# Patient Record
Sex: MALE | Race: WHITE | NOT HISPANIC OR LATINO | Employment: FULL TIME | URBAN - METROPOLITAN AREA
[De-identification: names, ages, dates, MRNs, and addresses within clinical notes are randomized per-mention and may not be internally consistent; named-entity substitution may affect disease eponyms.]

---

## 2021-03-31 ENCOUNTER — OFFICE VISIT (OUTPATIENT)
Dept: RADIATION ONCOLOGY | Facility: HOSPITAL | Age: 56
End: 2021-03-31

## 2021-03-31 ENCOUNTER — HOSPITAL ENCOUNTER (OUTPATIENT)
Dept: RADIATION ONCOLOGY | Facility: HOSPITAL | Age: 56
Setting detail: RADIATION/ONCOLOGY SERIES
Discharge: HOME | End: 2021-03-31

## 2021-03-31 VITALS
BODY MASS INDEX: 26.81 KG/M2 | WEIGHT: 176.9 LBS | RESPIRATION RATE: 16 BRPM | SYSTOLIC BLOOD PRESSURE: 154 MMHG | TEMPERATURE: 97.6 F | OXYGEN SATURATION: 98 % | HEART RATE: 75 BPM | HEIGHT: 68 IN | DIASTOLIC BLOOD PRESSURE: 74 MMHG

## 2021-03-31 DIAGNOSIS — C61 PROSTATE CANCER (HCC): Primary | ICD-10-CM

## 2021-03-31 PROCEDURE — G0463 HOSPITAL OUTPT CLINIC VISIT: HCPCS

## 2021-03-31 RX ORDER — LISINOPRIL 2.5 MG/1
TABLET ORAL
COMMUNITY

## 2021-03-31 RX ORDER — METOPROLOL SUCCINATE 50 MG/1
TABLET, EXTENDED RELEASE ORAL
COMMUNITY

## 2021-03-31 RX ORDER — ATORVASTATIN CALCIUM 10 MG/1
TABLET, FILM COATED ORAL
COMMUNITY

## 2021-03-31 RX ORDER — SILDENAFIL CITRATE 20 MG/1
TABLET ORAL
COMMUNITY

## 2021-03-31 RX ORDER — GLIPIZIDE 10 MG/1
TABLET ORAL
COMMUNITY

## 2021-03-31 RX ORDER — MULTIPLE VITAMINS W/ MINERALS TAB 9MG-400MCG
1 TAB ORAL DAILY
COMMUNITY

## 2021-03-31 RX ORDER — CHLORAL HYDRATE 500 MG
CAPSULE ORAL
COMMUNITY

## 2021-03-31 NOTE — PROGRESS NOTES
CONSULTATION NOTE      :                                                          1965  DATE OF CONSULTATION:                       3/31/2021   REQUESTING PHYSICIAN:                   Brent Vicente, *  REASON FOR CONSULTATION:           Prostate cancer (CMS/[facility])  Initial diagnosis- Stage I (cT1c, cN0, cM0, PSA: 7.8, Grade Group: 1)  Current- Stage IIA (cT1c, cN0, cM0, PSA: 10.2, Grade Group: 1)         BRIEF HISTORY:  The patient is a very pleasant 55 y.o. male  with prostate cancer initially diagnosed with low risk disease in  when PSA had increased to a value 7.8 ng/ml.  He has been on active surveillance with fairly stable PSA values of 7.62 ng/ml in 2020 and 7.88 ng/ml in 2020.  Repeat prostate biopsy 2020 revealed prostate cancer, [place]'s score 3+3 = 6 involving 2 out of 6 cores from the left lobe.  Tumor was present at the apex involving 5 and 40% of submitted tissue from those samples.  There was no mention of perineural invasion or extraprostatic extension.  Right lobe was benign.  PSA recently increased further to a value of 10.19 ng/ml on 3/4/2021.  Patient is now interested in pursuing definitive treatment.  He is interested in nonsurgical treatment options.  He is otherwise very healthy with controlled diabetes.  He is active, working.  [facility] healthy life expectancy calculator predicts an additional 32.2-year longevity is possible.    Allergy: Not on File    Social History:   Social History     Socioeconomic History   • Marital status:      Spouse name: Not on file   • Number of children: Not on file   • Years of education: Not on file   • Highest education level: Not on file   Tobacco Use   • Smoking status: Never Smoker   • Smokeless tobacco: Never Used   Substance and Sexual Activity   • Alcohol use: Yes     Comment: 1-2 beers per week   • Drug use: Never   • Sexual activity: Defer       Past Medical History:   Past Medical History:   Diagnosis Date   •  "Diabetes mellitus (CMS/HCC)    • Hypertension    • Pancreatitis    • Prostate cancer (CMS/HCC)        Family History: family history includes Breast cancer in his sister; Cervical cancer in his mother; Cirrhosis in his father; Diabetes in his mother; Heart disease in his father.     Surgical History:   Past Surgical History:   Procedure Laterality Date   • CHOLECYSTECTOMY     • COLONOSCOPY  08/2019   • PROSTATE BIOPSY          Review of Systems:   Review of Systems   All other systems reviewed and are negative.          Objective   VITAL SIGNS:   Vitals:    03/31/21 0937   BP: 154/74   Pulse: 75   Resp: 16   Temp: 97.6 °F (36.4 °C)   TempSrc: Temporal   SpO2: 98%  Comment: RA   Weight: 80.2 kg (176 lb 14.4 oz)   Height: 172.7 cm (68\")   PainSc: 0-No pain        Karnofsky score: 90      Physical Exam:   Physical Exam  Vitals and nursing note reviewed.   Constitutional:       Appearance: He is well-developed.   HENT:      Head: Normocephalic and atraumatic.   Cardiovascular:      Rate and Rhythm: Normal rate and regular rhythm.      Heart sounds: Normal heart sounds. No murmur heard.     Pulmonary:      Effort: Pulmonary effort is normal.      Breath sounds: Normal breath sounds. No wheezing or rales.   Abdominal:      General: Bowel sounds are normal. There is no distension.      Palpations: Abdomen is soft.      Tenderness: There is no abdominal tenderness.   Genitourinary:     Prostate: Enlarged ( 50 to 60 cc, symmetrically enlarged, smooth surface, soft, no nodules or induration.  Seminal vesicles are nonpalpable.). Not tender and no nodules present.      Rectum: No mass, tenderness, external hemorrhoid or internal hemorrhoid. Normal anal tone.   Musculoskeletal:         General: No tenderness. Normal range of motion.      Cervical back: Normal range of motion and neck supple.   Lymphadenopathy:      Cervical: No cervical adenopathy.      Upper Body:      Right upper body: No supraclavicular adenopathy.      Left " upper body: No supraclavicular adenopathy.   Skin:     General: Skin is warm and dry.   Neurological:      Mental Status: He is alert and oriented to person, place, and time.      Sensory: No sensory deficit.   Psychiatric:         Behavior: Behavior normal.         Thought Content: Thought content normal.         Judgment: Judgment normal.               IPSS Questionnaire (AUA-7):  Over the past month…    1)  Incomplete Emptying  How often have you had a sensation of not emptying your bladder?  1 - Less than 1 time in 5   2)  Frequency  How often have you had to urinate less than every two hours? 3 - About half the time   3)  Intermittency  How often have you found you stopped and started again several times when you urinated?  2 - Less than half the time   4) Urgency  How often have you found it difficult to postpone urination?  0 - Not at all   5) Weak Stream  How often have you had a weak urinary stream?  2 - Less than half the time   6) Straining  How often have you had to push or strain to begin urination?  0 - Not at all   7) Nocturia  How many times did you typically get up at night to urinate?  1 - 1 time   Total Score:  9       Quality of life due to urinary symptoms:  If you were to spend the rest of your life with your urinary condition the way it is now, how would you feel about that? 1-Pleased   Urine Leakage (Incontinence) 0-No Leakage     Sexual Health Inventory  Current Status    1)  How do you rate your confidence that you could achieve and keep an erection? 3-Moderate   2) When you had erections with sexual stimulation, how often were your erections hard enough for penetration (entering your partner)? 3-Sometime (about half the time)   3)  During sexual intercourse, how often were you able to maintain your erection after you had penetrated (entered) into your partner? 4-Most times (much more than half the time)   4) During sexual intercourse, how difficult was it to maintain your erection to  completion of intercourse? 5-Not difficult   5) When you attempted sexual intercourse, how often was it satisfactory to you? 5-Almost always or always   Total Score: 20       Bowel Health Inventory  Current Status: 0-No problems, no rectal bleeding, no discharge, less then 5 bowel movements a day             The following portions of the patient's history were reviewed and updated as appropriate: allergies, current medications, past family history, past medical history, past social history, past surgical history and problem list.    Assessment:   Assessment      Prostate cancer, [place] score 3+3 = 6, clinical stage IIA (T1c, N0, M0).  He has been on active surveillance since initial diagnosis in 2019.  PSA has increased from 7.8  ng/ml to most recent value 10.19 ng/ml.  He does have a repeat PSA scheduled for next month.  If this confirms higher value, he would like to pursue definitive treatment, which would be very reasonable, and recommended, at his age.  We reviewed the radiation treatment options of SBRT, IMRT and brachytherapy.  He is most interested in stereotactic body radiotherapy.  The CyberKnife treatment procedure has been reviewed in detail.  All questions answered.  Informed consent obtained.    RECOMMENDATIONS: Repeat PSA next month.  (Patient will hold all vitamin supplements prior to lab draw.)  Gold seed fiducial placement next month.  He will subsequently return here for treatment planning CT and MRI pelvis.  Prostate gland will receive 5 fractions of 7 Gray each, delivered on the CyberKnife.    Follow Up:   Return in about 5 weeks (around 5/5/2021) for Office Visit, Simulation.  Diagnoses and all orders for this visit:    1. Prostate cancer (CMS/HCC) (Primary)  -     MRI Cyberknife Pelvis Without Contrast; Future  -     Ambulatory Referral to OP [facility] Nutrition Services         Ever Ding MD

## 2021-04-29 ENCOUNTER — DOCUMENTATION (OUTPATIENT)
Dept: NUTRITION | Facility: HOSPITAL | Age: 56
End: 2021-04-29

## 2021-04-29 NOTE — PROGRESS NOTES
[facility] Nutrition      Phone consult with patient regarding the Gas Elimination Diet and instructions in preparation for the imaging scans and CK treatment for prostate cancer 5/5/21.  Reviewed the rationale for the diet modification, gas forming foods, schedule for following, GasX, enemas and NPO x 6 hours prior to MRI.  Patient verbalized excellent comprehension of diet; all questions were addressed.

## 2021-05-05 ENCOUNTER — HOSPITAL ENCOUNTER (OUTPATIENT)
Dept: MRI IMAGING | Facility: HOSPITAL | Age: 56
Discharge: HOME | End: 2021-05-05
Admitting: RADIOLOGY

## 2021-05-05 ENCOUNTER — OFFICE VISIT (OUTPATIENT)
Dept: RADIATION ONCOLOGY | Facility: HOSPITAL | Age: 56
End: 2021-05-05

## 2021-05-05 ENCOUNTER — HOSPITAL ENCOUNTER (OUTPATIENT)
Dept: RADIATION ONCOLOGY | Facility: HOSPITAL | Age: 56
Discharge: HOME | End: 2021-05-05

## 2021-05-05 ENCOUNTER — HOSPITAL ENCOUNTER (OUTPATIENT)
Dept: RADIATION ONCOLOGY | Facility: HOSPITAL | Age: 56
Setting detail: RADIATION/ONCOLOGY SERIES
Discharge: HOME | End: 2021-05-05

## 2021-05-05 VITALS
WEIGHT: 175.7 LBS | TEMPERATURE: 96.9 F | HEART RATE: 67 BPM | DIASTOLIC BLOOD PRESSURE: 73 MMHG | BODY MASS INDEX: 26.72 KG/M2 | SYSTOLIC BLOOD PRESSURE: 135 MMHG | OXYGEN SATURATION: 96 % | RESPIRATION RATE: 20 BRPM

## 2021-05-05 DIAGNOSIS — C61 PROSTATE CANCER (HCC): Primary | ICD-10-CM

## 2021-05-05 DIAGNOSIS — C61 PROSTATE CANCER (HCC): ICD-10-CM

## 2021-05-05 PROCEDURE — 72195 MRI PELVIS W/O DYE: CPT

## 2021-05-05 PROCEDURE — G0463 HOSPITAL OUTPT CLINIC VISIT: HCPCS

## 2021-05-05 RX ORDER — TAMSULOSIN HYDROCHLORIDE 0.4 MG/1
1 CAPSULE ORAL NIGHTLY
Qty: 30 CAPSULE | Refills: 11 | Status: SHIPPED | OUTPATIENT
Start: 2021-05-05 | End: 2021-12-30

## 2021-05-05 NOTE — PROGRESS NOTES
RE-EVALUATION    PATIENT:                                                      Jerrod Valenzuela  :                                                          1965  DATE:                          2021   DIAGNOSIS:     Prostate cancer (CMS/[facility])  - Stage I (cT1c, cN0, cM0, PSA: 7.8, Grade Group: 1)  - Stage IIA (cT1c, cN0, cM0, PSA: 10.2, Grade Group: 1)         BRIEF HISTORY:  The patient is a very pleasant 55 y.o. male  with low/intermediate risk prostate cancer.  He had been on active surveillance since initial diagnosis in 2019.  PSA increased to maximum value 10.19 ng/ml.  He chose to undergo definitive treatment with stereotactic body radiotherapy.  More recent PSA drawn 2021 was 7.26 ng/ml.    He underwent placement of gold seed fiducial with minimal difficulty.  He experienced hematuria for a few days which has resolved.  Urinary voiding is now back to baseline doing well.  He has had no change in health since informed consent was obtained on 3/31/2021 and he remains a good candidate for treatment.    Not on File    Review of Systems   All other systems reviewed and are negative.            IPSS Questionnaire (AUA-7):  Over the past month…     1)  Incomplete Emptying  How often have you had a sensation of not emptying your bladder?  1 - Less than 1 time in 5   2)  Frequency  How often have you had to urinate less than every two hours? 3 - About half the time   3)  Intermittency  How often have you found you stopped and started again several times when you urinated?  2 - Less than half the time   4) Urgency  How often have you found it difficult to postpone urination?  0 - Not at all   5) Weak Stream  How often have you had a weak urinary stream?  2 - Less than half the time   6) Straining  How often have you had to push or strain to begin urination?  0 - Not at all   7) Nocturia  How many times did you typically get up at night to urinate?  1 - 1 time   Total Score:  9         Quality of life due  to urinary symptoms:  If you were to spend the rest of your life with your urinary condition the way it is now, how would you feel about that? 1-Pleased   Urine Leakage (Incontinence) 0-No Leakage      Sexual Health Inventory  Current Status     1)  How do you rate your confidence that you could achieve and keep an erection? 3-Moderate   2) When you had erections with sexual stimulation, how often were your erections hard enough for penetration (entering your partner)? 3-Sometime (about half the time)   3)  During sexual intercourse, how often were you able to maintain your erection after you had penetrated (entered) into your partner? 4-Most times (much more than half the time)   4) During sexual intercourse, how difficult was it to maintain your erection to completion of intercourse? 5-Not difficult   5) When you attempted sexual intercourse, how often was it satisfactory to you? 5-Almost always or always   Total Score: 20         Bowel Health Inventory  Current Status: 0-No problems, no rectal bleeding, no discharge, less then 5 bowel movements a day                   Objective   VITAL SIGNS:   Vitals:    05/05/21 1252   BP: 135/73   Pulse: 67   Resp: 20   Temp: 96.9 °F (36.1 °C)   TempSrc: Temporal   SpO2: 96%   Weight: 79.7 kg (175 lb 11.2 oz)   PainSc: 0-No pain        KPS 90%    Physical Exam  Vitals and nursing note reviewed.   Constitutional:       Appearance: He is well-developed.   HENT:      Head: Normocephalic and atraumatic.   Cardiovascular:      Rate and Rhythm: Normal rate and regular rhythm.      Heart sounds: No murmur heard.     Pulmonary:      Effort: Pulmonary effort is normal.      Breath sounds: No wheezing or rales.   Abdominal:      General: There is no distension.      Palpations: Abdomen is soft.      Tenderness: There is no abdominal tenderness.   Musculoskeletal:         General: No tenderness. Normal range of motion.      Cervical back: Normal range of motion and neck supple.    Lymphadenopathy:      Cervical: No cervical adenopathy.      Upper Body:      Right upper body: No supraclavicular adenopathy.      Left upper body: No supraclavicular adenopathy.   Skin:     General: Skin is warm and dry.   Neurological:      Mental Status: He is alert and oriented to person, place, and time.      Sensory: No sensory deficit.   Psychiatric:         Behavior: Behavior normal.         Thought Content: Thought content normal.         Judgment: Judgment normal.              The following portions of the patient's history were reviewed and updated as appropriate: allergies, current medications, past family history, past medical history, past social history, past surgical history and problem list.    Diagnoses and all orders for this visit:    Prostate cancer (CMS/[facility])    Other orders  -     tamsulosin (FLOMAX) 0.4 MG capsule 24 hr capsule; Take 1 capsule by mouth Every Night.      IMPRESSION:  Prostate cancer, [place] score 3+3 = 6, clinical stage IIA (T1c, N0, M0).  He has been on active surveillance since initial diagnosis in 2019.  PSA reached maximum value 10.19 ng/ml.  Most recent pretreatment PSA 7.26 ng/ml.  Patient wishes to proceed with definitive treatment.    RECOMMENDATIONS: CT simulation and MRI pelvis will be performed today.  The prostate gland will receive 5 fractions of 7 Gray each, delivered daily, on the CyberKnife.         Ever Ding MD

## 2021-05-18 PROCEDURE — 77338 DESIGN MLC DEVICE FOR IMRT: CPT | Performed by: RADIOLOGY

## 2021-05-18 PROCEDURE — 77300 RADIATION THERAPY DOSE PLAN: CPT | Performed by: RADIOLOGY

## 2021-05-18 PROCEDURE — 77301 RADIOTHERAPY DOSE PLAN IMRT: CPT | Performed by: RADIOLOGY

## 2021-05-24 ENCOUNTER — HOSPITAL ENCOUNTER (OUTPATIENT)
Dept: RADIATION ONCOLOGY | Facility: HOSPITAL | Age: 56
Discharge: HOME | End: 2021-05-24

## 2021-05-24 PROCEDURE — 77373 STRTCTC BDY RAD THER TX DLVR: CPT | Performed by: RADIOLOGY

## 2021-05-25 ENCOUNTER — HOSPITAL ENCOUNTER (OUTPATIENT)
Dept: RADIATION ONCOLOGY | Facility: HOSPITAL | Age: 56
Discharge: HOME | End: 2021-05-25

## 2021-05-25 PROCEDURE — 77373 STRTCTC BDY RAD THER TX DLVR: CPT | Performed by: RADIOLOGY

## 2021-05-25 PROCEDURE — 77290 THER RAD SIMULAJ FIELD CPLX: CPT | Performed by: RADIOLOGY

## 2021-05-26 ENCOUNTER — HOSPITAL ENCOUNTER (OUTPATIENT)
Dept: RADIATION ONCOLOGY | Facility: HOSPITAL | Age: 56
Discharge: HOME | End: 2021-05-26

## 2021-05-26 PROCEDURE — 77373 STRTCTC BDY RAD THER TX DLVR: CPT | Performed by: RADIOLOGY

## 2021-05-27 ENCOUNTER — HOSPITAL ENCOUNTER (OUTPATIENT)
Dept: RADIATION ONCOLOGY | Facility: HOSPITAL | Age: 56
Discharge: HOME | End: 2021-05-27

## 2021-05-27 PROCEDURE — 77373 STRTCTC BDY RAD THER TX DLVR: CPT | Performed by: RADIOLOGY

## 2021-05-28 ENCOUNTER — HOSPITAL ENCOUNTER (OUTPATIENT)
Dept: RADIATION ONCOLOGY | Facility: HOSPITAL | Age: 56
Discharge: HOME | End: 2021-05-28

## 2021-05-28 PROCEDURE — 77336 RADIATION PHYSICS CONSULT: CPT | Performed by: RADIOLOGY

## 2021-05-28 PROCEDURE — 77373 STRTCTC BDY RAD THER TX DLVR: CPT | Performed by: RADIOLOGY

## 2021-06-29 ENCOUNTER — HOSPITAL ENCOUNTER (OUTPATIENT)
Dept: RADIATION ONCOLOGY | Facility: HOSPITAL | Age: 56
Setting detail: RADIATION/ONCOLOGY SERIES
Discharge: HOME | End: 2021-06-29

## 2021-06-29 ENCOUNTER — OFFICE VISIT (OUTPATIENT)
Dept: RADIATION ONCOLOGY | Facility: HOSPITAL | Age: 56
End: 2021-06-29

## 2021-06-29 DIAGNOSIS — C61 PROSTATE CANCER (HCC): Primary | ICD-10-CM

## 2021-06-29 NOTE — PROGRESS NOTES
TELEMEDICINE FOLLOW UP NOTE    PATIENT:                                                      Jerrod Valenzuela  MEDICAL RECORD #:                        [id number]  :                                                          1965  COMPLETION DATE:   2021  DIAGNOSIS:     Prostate cancer (CMS/HCC)  - Stage I (cT1c, cN0, cM0, PSA: 7.8, Grade Group: 1)  - Stage IIA (cT1c, cN0, cM0, PSA: 10.2, Grade Group: 1)      This visit has been rescheduled as a phone visit to comply with patient safety concerns in accordance with CDC recommendations in light of the COVID-19 pandemic. Total time of discussion was 19 minutes.  Verbal consent given.      BRIEF HISTORY:  Initial follow-up for low intermediate risk prostate cancer, previously on active surveillance since initial diagnosis in 2019.   He underwent definitive treatment with CyberKnife SBRT.  He tolerated treatment well.  Grade 1 fatigue continues to lex.  He experienced mild exacerbation of urinary frequency, nocturia x2, and hesitancy starting stream of urine in the setting of mild, chronic lower urinary tract symptoms.  He reports IPSS is nearly at baseline.  He denies hematuria, dysuria, or incontinence.  He continues on Flomax nightly.  He experienced a brief duration of bowel disturbance with constipation.  Bowels have largely returned to normal.  He continues prn sildenafil for chronic erectile dysfunction.  No new aches or pains.  No acute concerns today.        IPSS Questionnaire (AUA-7):  Over the past month…    1)  Incomplete Emptying  How often have you had a sensation of not emptying your bladder?  1 - Less than 1 time in 5   2)  Frequency  How often have you had to urinate less than every two hours? 3 - About half the time   3)  Intermittency  How often have you found you stopped and started again several times when you urinated?  2 - Less than half the time   4) Urgency  How often have you found it difficult to postpone urination?  0 - Not at all    5) Weak Stream  How often have you had a weak urinary stream?  2 - Less than half the time   6) Straining  How often have you had to push or strain to begin urination?  0 - Not at all   7) Nocturia  How many times did you typically get up at night to urinate?  1 - 1 time   Total Score:  9       Quality of life due to urinary symptoms:  If you were to spend the rest of your life with your urinary condition the way it is now, how would you feel about that? 1-Pleased   Urine Leakage (Incontinence) 0-No Leakage     Sexual Health Inventory  Current Status    1)  How do you rate your confidence that you could achieve and keep an erection? 3-Moderate   2) When you had erections with sexual stimulation, how often were your erections hard enough for penetration (entering your partner)? 3-Sometime (about half the time)   3)  During sexual intercourse, how often were you able to maintain your erection after you had penetrated (entered) into your partner? 4-Most times (much more than half the time)   4) During sexual intercourse, how difficult was it to maintain your erection to completion of intercourse? 5-Not difficult   5) When you attempted sexual intercourse, how often was it satisfactory to you? 5-Almost always or always   Total Score: 20       Bowel Health Inventory  Current Status: 0-No problems, no rectal bleeding, no discharge, less then 5 bowel movements a day           MEDICATIONS: Medication reconciliation for the patient was reviewed and confirmed in the electronic medical record.    Review of Systems   All other systems reviewed and are negative.      KPS 90%    Physical Exam  Pulmonary:      Respirations even, unlabored. No audible wheezing or cough.  Neurological:      A+Ox4, conversant, answers questions appropriately.  Psychiatric:     Judgement, affect, and decision-making WNL.    Limited physical exam as visit was conducted remotely via telephone in light of the COVID-19 pandemic.      The following  portions of the patient's history were reviewed and updated as appropriate: allergies, current medications, past family history, past medical history, past social history, past surgical history and problem list.         Diagnoses and all orders for this visit:    1. Prostate cancer (CMS/[facility]) (Primary)         IMPRESSION:  Prostate cancer, [place] score 3+3 = 6, clinical stage IIA (T1c, N0, M0).  He has been on active surveillance since initial diagnosis in 2019.  PSA reached maximum value 10.19 ng/ml.  Most recent pretreatment PSA 7.26 ng/ml.    1 month status post CyberKnife SBRT.  He tolerated treatment well.  Acute residual radiation-related toxicities continue to lex.  We discussed plan to taper/discontinue Flomax, as tolerated.  Prognosis should remain excellent.    Mr. Valenzuela and I have reviewed the survivorship care plan in detail.  We discussed diagnosis, follow-up intervals, PSA monitoring, and expectations for response to treatment.  A copy care plan has been mailed to the patient.  A copy has also been sent to his PCP.      RECOMMENDATIONS:  Mr. Valenzuela continues urologic surveillance under the care of Dr. Gibbons, with follow-up and repeat PSA scheduled 9/1/2021.    Return in about 6 months (around 12/30/2021) for Office Visit.    YASEMIN Ramos

## 2021-12-30 ENCOUNTER — HOSPITAL ENCOUNTER (OUTPATIENT)
Dept: RADIATION ONCOLOGY | Facility: HOSPITAL | Age: 56
Setting detail: RADIATION/ONCOLOGY SERIES
Discharge: HOME | End: 2021-12-30

## 2021-12-30 ENCOUNTER — OFFICE VISIT (OUTPATIENT)
Dept: RADIATION ONCOLOGY | Facility: HOSPITAL | Age: 56
End: 2021-12-30

## 2021-12-30 VITALS
OXYGEN SATURATION: 98 % | HEIGHT: 68 IN | HEART RATE: 80 BPM | RESPIRATION RATE: 16 BRPM | BODY MASS INDEX: 27.1 KG/M2 | SYSTOLIC BLOOD PRESSURE: 139 MMHG | DIASTOLIC BLOOD PRESSURE: 78 MMHG | WEIGHT: 178.8 LBS | TEMPERATURE: 97.5 F

## 2021-12-30 DIAGNOSIS — C61 PROSTATE CANCER (HCC): ICD-10-CM

## 2021-12-30 PROCEDURE — G0463 HOSPITAL OUTPT CLINIC VISIT: HCPCS

## 2021-12-30 NOTE — PROGRESS NOTES
FOLLOW UP NOTE    PATIENT:                                                      Jerrod Valenzuela  MEDICAL RECORD #:                        [id number]  :                                                          1965  COMPLETION DATE:   2021  DIAGNOSIS:     Prostate cancer (HCC)  - Stage I (cT1c, cN0, cM0, PSA: 7.8, Grade Group: 1)  - Stage IIA (cT1c, cN0, cM0, PSA: 10.2, Grade Group: 1)      BRIEF HISTORY:    Routine follow-up visit.  Low intermediate risk prostate cancer, previously on active surveillance since initial diagnosis in 2019.  He underwent definitive treatment with CyberKnife SBRT.  He tolerated treatment well.  He is voiding well.  He reports occasional urinary frequency, and otherwise very minimal lower urinary tract symptoms.   He discontinued use of Flomax several months ago.  He denies hematuria, dysuria, or incontinence.  Bowel function is normal.  Erectile function is satisfactory with use of as needed sildenafil 40 mg.  He does report some expected diminished semen volume.  No new aches or pains.  Overall, he feels well.  Initial posttreatment PSA declined to 2.64 NG/mL on 2021.      IPSS Questionnaire (AUA-7):  Over the past month…    1)  Incomplete Emptying  How often have you had a sensation of not emptying your bladder?  0 - Not at all   2)  Frequency  How often have you had to urinate less than every two hours? 2 - Less than half the time   3)  Intermittency  How often have you found you stopped and started again several times when you urinated?  1 - Less than 1 time in 5   4) Urgency  How often have you found it difficult to postpone urination?  0 - Not at all   5) Weak Stream  How often have you had a weak urinary stream?  1 - Less than 1 time in 5   6) Straining  How often have you had to push or strain to begin urination?  0 - Not at all   7) Nocturia  How many times did you typically get up at night to urinate?  1 - 1 time   Total Score:  5       Quality of life due to  urinary symptoms:  If you were to spend the rest of your life with your urinary condition the way it is now, how would you feel about that? 1-Pleased   Urine Leakage (Incontinence) 0-No Leakage     Sexual Health Inventory  Current Status    1)  How do you rate your confidence that you could achieve and keep an erection? 3-Moderate   2) When you had erections with sexual stimulation, how often were your erections hard enough for penetration (entering your partner)? 5-Almost always or always   3)  During sexual intercourse, how often were you able to maintain your erection after you had penetrated (entered) into your partner? 5-Almost always or always   4) During sexual intercourse, how difficult was it to maintain your erection to completion of intercourse? 5-Not difficult   5) When you attempted sexual intercourse, how often was it satisfactory to you? 4-Most times (much more than half the time)   Total Score: 22       Bowel Health Inventory  Current Status: 0-No problems, no rectal bleeding, no discharge, less then 5 bowel movements a day       MEDICATIONS: Medication reconciliation for the patient was reviewed and confirmed in the electronic medical record.    Review of Systems   All other systems reviewed and are negative.          KPS 90%    Physical Exam  Vitals and nursing note reviewed.   Constitutional:       General: He is not in acute distress.     Appearance: He is well-developed.   HENT:      Head: Normocephalic and atraumatic.   Eyes:      Conjunctiva/sclera: Conjunctivae normal.      Pupils: Pupils are equal, round, and reactive to light.   Cardiovascular:      Rate and Rhythm: Normal rate and regular rhythm.      Heart sounds: No murmur heard.  No friction rub.   Pulmonary:      Effort: Pulmonary effort is normal.      Breath sounds: Normal breath sounds. No wheezing.   Abdominal:      General: Bowel sounds are normal. There is no distension.      Palpations: Abdomen is soft. There is no mass.       "Tenderness: There is no abdominal tenderness.   Genitourinary:     Prostate: Enlarged (30 cc (previously 50-60 cc), smooth, symmetric, no nodules or induration.  Seminal vesicles are nonpalpable.). Not tender and no nodules present.      Rectum: No mass, external hemorrhoid or internal hemorrhoid. Normal anal tone.   Musculoskeletal:         General: Normal range of motion.      Cervical back: Normal range of motion and neck supple.   Lymphadenopathy:      Cervical: No cervical adenopathy.   Skin:     General: Skin is warm and dry.   Neurological:      Mental Status: He is alert and oriented to person, place, and time.   Psychiatric:         Behavior: Behavior normal.         Thought Content: Thought content normal.         Judgment: Judgment normal.         VITAL SIGNS:   Vitals:    12/30/21 1449   BP: 139/78   Pulse: 80   Resp: 16   Temp: 97.5 °F (36.4 °C)   SpO2: 98%  Comment: RA   Weight: 81.1 kg (178 lb 12.8 oz)   Height: 172.7 cm (68\")   PainSc: 0-No pain     LABORATORY:  PSA 9/1/2021 = 2.64 ng/mL        The following portions of the patient's history were reviewed and updated as appropriate: allergies, current medications, past family history, past medical history, past social history, past surgical history and problem list.         Diagnoses and all orders for this visit:    1. Prostate cancer (HCC)         IMPRESSION:  Prostate cancer, Herndon score 3+3 = 6, clinical stage IIA (T1c, N0, M0).  He has been on active surveillance since initial diagnosis in 2019.  PSA reached maximum value 10.19 ng/ml.  Most recent pretreatment PSA 7.26 ng/ml.    7 months status post CyberKnife SBRT.  He tolerated treatment well.  He has had excellent early clinical and biochemical response with downsizing of the prostate and PSA reduction to 2.64 ng/mL.  Prognosis should remain excellent.  We reviewed follow-up intervals, PSA monitoring, and continued expectations for response to treatment.    RECOMMENDATIONS:   Mr. Valenzuela " continues urologic surveillance under the care of Dr. Gibbons, with follow-up and repeat PSA scheduled 3/4/2022.  We will schedule I additional follow-up in 1 year, at which point the patient will likely have reached target PSA kota <0.5 ng/mL.    Return in about 1 year (around 12/30/2022) for Office Visit.    Al Allen, APRN

## 2022-12-30 ENCOUNTER — OFFICE VISIT (OUTPATIENT)
Dept: RADIATION ONCOLOGY | Facility: HOSPITAL | Age: 57
End: 2022-12-30

## 2022-12-30 ENCOUNTER — HOSPITAL ENCOUNTER (OUTPATIENT)
Dept: RADIATION ONCOLOGY | Facility: HOSPITAL | Age: 57
Setting detail: RADIATION/ONCOLOGY SERIES
Discharge: HOME | End: 2022-12-30
Payer: COMMERCIAL

## 2022-12-30 VITALS
TEMPERATURE: 96.8 F | OXYGEN SATURATION: 98 % | HEART RATE: 80 BPM | SYSTOLIC BLOOD PRESSURE: 138 MMHG | WEIGHT: 177.9 LBS | BODY MASS INDEX: 27.05 KG/M2 | DIASTOLIC BLOOD PRESSURE: 73 MMHG | RESPIRATION RATE: 16 BRPM

## 2022-12-30 DIAGNOSIS — C61 PROSTATE CANCER: Primary | ICD-10-CM

## 2022-12-30 RX ORDER — TAMSULOSIN HYDROCHLORIDE 0.4 MG/1
1 CAPSULE ORAL
Qty: 30 CAPSULE | Refills: 5 | Status: SHIPPED | OUTPATIENT
Start: 2022-12-30

## 2022-12-30 NOTE — PROGRESS NOTES
FOLLOW UP NOTE    PATIENT:                                                      Jerrod Valenzuela  MEDICAL RECORD #:                        [id number]  :                                                          1965  COMPLETION DATE:   2021  DIAGNOSIS:     Prostate cancer (HCC)  - Stage I (cT1c, cN0, cM0, PSA: 7.8, Grade Group: 1)  - Stage IIA (cT1c, cN0, cM0, PSA: 10.2, Grade Group: 1)      BRIEF HISTORY:    Routine follow-up visit.  Low intermediate risk prostate cancer, previously on active surveillance since initial diagnosis in 2019.  He underwent definitive treatment with CyberKnife SBRT.  He tolerated treatment well.  He denies acute urinary complaints.  He does report occasional straining to urinate, particularly since discontinuing Flomax several months ago.  He admits to recently restarting Flomax every other night which has helped his urinary outflow obstructive symptoms.  He previously had some dizziness, but none since beginning an every other night schedule.  He denies hematuria, dysuria, or urinary incontinence.  He reports bowel function is normal.  No GI complaints.  Erectile function is satisfactory with use of sildenafil 40 mg as needed.  Energy level is good.  No new or progressive bone pain.  No unexplained weight loss.  Overall, he feels well.  PSA continues to decline, with most recent value of 1.58 ng/ml on 2022, down from prior value of 2.12 ng/ml.        IPSS Questionnaire (AUA-7):  Over the past month…    1)  Incomplete Emptying  How often have you had a sensation of not emptying your bladder?  1 - Less than 1 time in 5   2)  Frequency  How often have you had to urinate less than every two hours? 2 - Less than half the time   3)  Intermittency  How often have you found you stopped and started again several times when you urinated?  2 - Less than half the time   4) Urgency  How often have you found it difficult to postpone urination?  0 - Not at all   5) Weak Stream  How often  have you had a weak urinary stream?  2 - Less than half the time   6) Straining  How often have you had to push or strain to begin urination?  3 - About half the time   7) Nocturia  How many times did you typically get up at night to urinate?  1 - 1 time   Total Score:  11       Quality of life due to urinary symptoms:  If you were to spend the rest of your life with your urinary condition the way it is now, how would you feel about that? 1-Pleased   Urine Leakage (Incontinence) 1-Mild (A few drops a day, no pad use)     Sexual Health Inventory  Current Status    1)  How do you rate your confidence that you could achieve and keep an erection? 3-Moderate   2) When you had erections with sexual stimulation, how often were your erections hard enough for penetration (entering your partner)? 5-Almost always or always   3)  During sexual intercourse, how often were you able to maintain your erection after you had penetrated (entered) into your partner? 5-Almost always or always   4) During sexual intercourse, how difficult was it to maintain your erection to completion of intercourse? 5-Not difficult   5) When you attempted sexual intercourse, how often was it satisfactory to you? 5-Almost always or always   Total Score: 23       Bowel Health Inventory  Current Status: 0-No problems, no rectal bleeding, no discharge, less then 5 bowel movements a day         MEDICATIONS: Medication reconciliation for the patient was reviewed and confirmed in the electronic medical record.    Review of Systems   Genitourinary: Positive for frequency.    All other systems reviewed and are negative.          KPS 90%      Physical Exam  Vitals and nursing note reviewed.   Constitutional:       General: He is not in acute distress.     Appearance: Normal appearance. He is well-developed.   HENT:      Head: Normocephalic and atraumatic.   Eyes:      Conjunctiva/sclera: Conjunctivae normal.      Pupils: Pupils are equal, round, and reactive to  light.   Cardiovascular:      Rate and Rhythm: Normal rate and regular rhythm.   Pulmonary:      Effort: Pulmonary effort is normal.      Breath sounds: Normal breath sounds.   Genitourinary:     Prostate: Not enlarged (small (pretreatment 50-60 cc), smooth, firm, symmetric.  No nodules or induration.), not tender and no nodules present.      Rectum: No mass, external hemorrhoid or internal hemorrhoid. Normal anal tone.   Musculoskeletal:         General: Normal range of motion.   Skin:     General: Skin is warm and dry.   Neurological:      Mental Status: He is alert and oriented to person, place, and time.   Psychiatric:         Behavior: Behavior normal.         Thought Content: Thought content normal.         Judgment: Judgment normal.         VITAL SIGNS:   Vitals:    12/30/22 1048   BP: 138/73   Pulse: 80   Resp: 16   Temp: 96.8 °F (36 °C)   TempSrc: Temporal   SpO2: 98%   Weight: 80.7 kg (177 lb 14.4 oz)   PainSc: 0-No pain     LABORATORY:  PSA 9/1/2021 = 2.64 ng/ml  PSA 3/4/2022 = 2.12 ng/ml  PSA 9/2/2022 = 1.58 ng/ml        The following portions of the patient's history were reviewed and updated as appropriate: allergies, current medications, past family history, past medical history, past social history, past surgical history and problem list.         Diagnoses and all orders for this visit:    1. Prostate cancer (HCC) (Primary)  -     tamsulosin (FLOMAX) 0.4 MG capsule 24 hr capsule; Take 1 capsule by mouth Every Other Day.  Dispense: 30 capsule; Refill: 5         IMPRESSION:  Prostate cancer, Nichole score 3+3 = 6, clinical stage IIA (T1c, N0, M0).  He has been on active surveillance since initial diagnosis in 2019.  PSA reached maximum value 10.19 ng/ml.  Most recent pretreatment PSA 7.26 ng/ml.    1-1/2 years status post CyberKnife SBRT.  He tolerated treatment well.  He has had a very good clinical and biochemical response with downsizing of the prostate and continued PSA reduction to 1.58 ng/ml.  I  suspect PSA has not yet reached kota value, and should continue to decline over the next year or so towards target kota value <0.5 ng/ml.  Prognosis should remain very good at this point.   I have refilled Flomax QOD at the request of the patient for chronic moderate lower urinary tract symptoms.  We discussed stopping the medication should he experience dizziness or intolerance of side effects.  We again reviewed follow-up intervals, biannual PSA monitoring, and continued expectations for response to treatment.      RECOMMENDATIONS:  Mr. Valenzuela continues routine urologic surveillance under the care of Dr. Gibbons, with follow-up and repeat PSA scheduled 3/10/2023.  We will schedule 1-year follow-up, at which point PSA will hopefully have reached target kota.      Return in about 1 year (around 12/30/2023) for Office Visit.    YASEMIN Ramos    I spent a total of 30 minutes on today's visit, with more than 15 minutes in direct face to face communication, and the remainder of the time spent in reviewing the relevant history, records, available imaging, and for documentation.

## 2023-12-29 ENCOUNTER — HOSPITAL ENCOUNTER (OUTPATIENT)
Dept: RADIATION ONCOLOGY | Facility: HOSPITAL | Age: 58
Setting detail: RADIATION/ONCOLOGY SERIES
Discharge: HOME | End: 2023-12-29
Payer: COMMERCIAL

## 2023-12-29 ENCOUNTER — OFFICE VISIT (OUTPATIENT)
Dept: RADIATION ONCOLOGY | Facility: HOSPITAL | Age: 58
End: 2023-12-29
Payer: COMMERCIAL

## 2023-12-29 VITALS
WEIGHT: 176 LBS | SYSTOLIC BLOOD PRESSURE: 138 MMHG | RESPIRATION RATE: 16 BRPM | HEART RATE: 74 BPM | TEMPERATURE: 97.6 F | DIASTOLIC BLOOD PRESSURE: 80 MMHG | OXYGEN SATURATION: 98 % | BODY MASS INDEX: 26.76 KG/M2

## 2023-12-29 DIAGNOSIS — C61 PROSTATE CANCER: Primary | ICD-10-CM

## 2023-12-29 PROCEDURE — G0463 HOSPITAL OUTPT CLINIC VISIT: HCPCS

## 2023-12-29 NOTE — PROGRESS NOTES
FOLLOW UP NOTE    PATIENT:                                                      Jerrod Valenzuela  MEDICAL RECORD #:                        [id number]  :                                                          1965  COMPLETION DATE:   2021  DIAGNOSIS:     Prostate cancer  - Stage I (cT1c, cN0, cM0, PSA: 7.8, Grade Group: 1)  - Stage IIA (cT1c, cN0, cM0, PSA: 10.2, Grade Group: 1)      BRIEF HISTORY:    Routine follow-up visit for low intermediate risk prostate cancer.  He underwent definitive treatment with CyberKnife SBRT.  He tolerated treatment well.  He reports urinary voiding is stable to slightly improved since his last visit, with predominant symptom of urinary frequency which is manageable and occurs less than 50% of the time.  He continues Flomax every other day with good control of symptoms.  He denies hematuria, dysuria, or urinary incontinence.  He reports bowel function is normal.  No GI complaints.  He reports erectile function is satisfactory with use of sildenafil 40 mg as needed.  Energy level is good.  No new or progressive bone pain.  No unexplained weight loss.  PSA continues to decline, with most recent value of 1.22 ng/ml on 3/10/2023, down from prior value of 1.58 ng/ml on 2022.      IPSS Questionnaire (AUA-7):  Over the past month…    1)  Incomplete Emptying  How often have you had a sensation of not emptying your bladder?  0 - Not at all   2)  Frequency  How often have you had to urinate less than every two hours? 2 - Less than half the time   3)  Intermittency  How often have you found you stopped and started again several times when you urinated?  1 - Less than 1 time in 5   4) Urgency  How often have you found it difficult to postpone urination?  0 - Not at all   5) Weak Stream  How often have you had a weak urinary stream?  1 - Less than 1 time in 5   6) Straining  How often have you had to push or strain to begin urination?  0 - Not at all   7) Nocturia  How many times  did you typically get up at night to urinate?  1 - 1 time   Total Score:  5       Quality of life due to urinary symptoms:  If you were to spend the rest of your life with your urinary condition the way it is now, how would you feel about that? 1-Pleased   Urine Leakage (Incontinence) 0-No Leakage     Sexual Health Inventory  Current Status    1)  How do you rate your confidence that you could achieve and keep an erection? 3-Moderate   2) When you had erections with sexual stimulation, how often were your erections hard enough for penetration (entering your partner)? 5-Almost always or always   3)  During sexual intercourse, how often were you able to maintain your erection after you had penetrated (entered) into your partner? 5-Almost always or always   4) During sexual intercourse, how difficult was it to maintain your erection to completion of intercourse? 4-Slightly difficult   5) When you attempted sexual intercourse, how often was it satisfactory to you? 4-Most times (much more than half the time)   Total Score: 21       Bowel Health Inventory  Current Status: 0-No problems, no rectal bleeding, no discharge, less then 5 bowel movements a day         MEDICATIONS: Medication reconciliation for the patient was reviewed and confirmed in the electronic medical record.    Review of Systems   Genitourinary:  Positive for frequency.    All other systems reviewed and are negative.          KPS 90%    Physical Exam  Vitals and nursing note reviewed.   Constitutional:       General: He is not in acute distress.     Appearance: He is well-developed.   HENT:      Head: Normocephalic and atraumatic.   Eyes:      Conjunctiva/sclera: Conjunctivae normal.      Pupils: Pupils are equal, round, and reactive to light.   Cardiovascular:      Rate and Rhythm: Normal rate and regular rhythm.   Pulmonary:      Effort: Pulmonary effort is normal.      Breath sounds: Normal breath sounds.   Genitourinary:     Prostate: Normal. Not  enlarged (Small, smooth, firm, symmetric.  No nodules or induration.), not tender and no nodules present.      Rectum: No mass, external hemorrhoid or internal hemorrhoid. Normal anal tone.   Musculoskeletal:         General: Normal range of motion.      Cervical back: Normal range of motion.   Skin:     General: Skin is warm and dry.   Neurological:      Mental Status: He is alert and oriented to person, place, and time.   Psychiatric:         Behavior: Behavior normal.         Thought Content: Thought content normal.         Judgment: Judgment normal.         VITAL SIGNS:   Vitals:    12/29/23 0932   BP: 138/80   Pulse: 74   Resp: 16   Temp: 97.6 °F (36.4 °C)   TempSrc: Temporal   SpO2: 98%   Weight: 79.8 kg (176 lb)   PainSc: 0-No pain             LABORATORY:  PSA 9/1/2021 = 2.64 ng/ml  PSA 3/4/2022 = 2.12 ng/ml  PSA 9/2/2022 = 1.58 ng/ml  PSA 3/10/2023 = 1.22 ng/ml          The following portions of the patient's history were reviewed and updated as appropriate: allergies, current medications, past family history, past medical history, past social history, past surgical history and problem list.         Diagnoses and all orders for this visit:    1. Prostate cancer (Primary)         IMPRESSION:  Prostate cancer, Nichole score 3+3 = 6, clinical stage IIA (T1c, N0, M0), initially under active surveillance since diagnosis in 2019.  PSA reached maximum value 10.19 ng/ml with subsequent pretreatment PSA 7.26 ng/ml.    2-1/2 years status post CyberKnife SBRT.  He tolerated treatment well.  No symptomology to suggest late radiation related toxicities at this time.  He continues Flomax every other day for management of mild obstructive/lower urinary tract symptoms.  He has had a good clinical response to treatment.  He has had an appropriate partial biochemical response to treatment with PSA in the low range of normal and continuing to gradually decline to most recent value of 1.22 ng/ml in March 2023.  He is scheduled  for annual PSA in March 2024 with urology.  Hopefully PSA will continue to decline towards target kota value <0.5 ng/ml.  Prognosis should continue to remain very good at this point.   We again reviewed follow-up intervals, serial PSA monitoring, and continued expectations for response to treatment.      RECOMMENDATIONS:  Mr. Valenzuela continues routine urologic surveillance under the care of Dr. Gibbons, with follow-up and repeat PSA scheduled in March 2024.  We will schedule 1-year follow-up, at which point PSA will hopefully have reached target kota.     Return in about 1 year (around 12/29/2024) for Office Visit.    YASEMIN Ramos spent a total of 30 minutes on today's visit, with more than 15 minutes in direct face to face communication, and the remainder of the time spent in reviewing the relevant history, records, available imaging, and for documentation.

## 2024-12-30 ENCOUNTER — HOSPITAL ENCOUNTER (OUTPATIENT)
Dept: RADIATION ONCOLOGY | Facility: HOSPITAL | Age: 59
Setting detail: RADIATION/ONCOLOGY SERIES
Discharge: HOME | End: 2024-12-30
Payer: COMMERCIAL

## 2024-12-30 ENCOUNTER — OFFICE VISIT (OUTPATIENT)
Dept: RADIATION ONCOLOGY | Facility: HOSPITAL | Age: 59
End: 2024-12-30
Payer: COMMERCIAL

## 2024-12-30 VITALS
SYSTOLIC BLOOD PRESSURE: 136 MMHG | HEART RATE: 86 BPM | WEIGHT: 167.8 LBS | TEMPERATURE: 96.9 F | RESPIRATION RATE: 16 BRPM | DIASTOLIC BLOOD PRESSURE: 72 MMHG | BODY MASS INDEX: 25.51 KG/M2 | OXYGEN SATURATION: 98 %

## 2024-12-30 DIAGNOSIS — C61 PROSTATE CANCER: Primary | ICD-10-CM

## 2024-12-30 PROCEDURE — G0463 HOSPITAL OUTPT CLINIC VISIT: HCPCS

## 2024-12-30 NOTE — PROGRESS NOTES
FOLLOW UP NOTE    PATIENT:                                                      Jerrod Valenzuela  MEDICAL RECORD #:                        [id number]  :                                                          1965  COMPLETION DATE:   2021   DIAGNOSIS:     Prostate cancer  - Stage I (cT1c, cN0, cM0, PSA: 7.8, Grade Group: 1)  - Stage IIA (cT1c, cN0, cM0, PSA: 10.2, Grade Group: 1)      BRIEF HISTORY:    Routine follow-up visit for low intermediate risk prostate cancer.  He underwent definitive treatment with CyberKnife SBRT.  He tolerated treatment well.  He reports urinary voiding is stable since his last visit and he denies notable irritative or outflow obstructive symptoms.  He continues Flomax every other day with good control of symptoms.  He denies gross hematuria, dysuria, or urinary incontinence.  He reports bowel function is normal.  No GI complaints.  He continues sildenafil 40 mg as needed for management of ED.  Energy level is good.  No new or progressive bone pain.  No unexplained weight loss.  PSA continues to sequentially decline.  His most recent PSA value was 0.659 ng/ml on 3/15/2024, down from prior value of 1.22 ng/ml.      IPSS Questionnaire (AUA-7):  Over the past month…    1)  Incomplete Emptying  How often have you had a sensation of not emptying your bladder?  0 - Not at all   2)  Frequency  How often have you had to urinate less than every two hours? 1 - Less than 1 time in 5   3)  Intermittency  How often have you found you stopped and started again several times when you urinated?  1 - Less than 1 time in 5   4) Urgency  How often have you found it difficult to postpone urination?  0 - Not at all   5) Weak Stream  How often have you had a weak urinary stream?  1 - Less than 1 time in 5   6) Straining  How often have you had to push or strain to begin urination?  0 - Not at all   7) Nocturia  How many times did you typically get up at night to urinate?  1 - 1 time   Total Score:   4       Quality of life due to urinary symptoms:  If you were to spend the rest of your life with your urinary condition the way it is now, how would you feel about that? 0-Delighted   Urine Leakage (Incontinence) 0-No Leakage     Sexual Health Inventory  Current Status    1)  How do you rate your confidence that you could achieve and keep an erection? 3-Moderate   2) When you had erections with sexual stimulation, how often were your erections hard enough for penetration (entering your partner)? 4-Most times (much more than half the time)   3)  During sexual intercourse, how often were you able to maintain your erection after you had penetrated (entered) into your partner? 5-Almost always or always   4) During sexual intercourse, how difficult was it to maintain your erection to completion of intercourse? 5-Not difficult   5) When you attempted sexual intercourse, how often was it satisfactory to you? 4-Most times (much more than half the time)   Total Score: 21       Bowel Health Inventory  Current Status: 0-No problems, no rectal bleeding, no discharge, less then 5 bowel movements a day           MEDICATIONS: Medication reconciliation for the patient was reviewed and confirmed in the electronic medical record.    Review of Systems   All other systems reviewed and are negative.          KPS 90%      Physical Exam  Vitals and nursing note reviewed.   Constitutional:       General: He is not in acute distress.     Appearance: Normal appearance. He is well-developed.   HENT:      Head: Normocephalic and atraumatic.   Eyes:      Conjunctiva/sclera: Conjunctivae normal.      Pupils: Pupils are equal, round, and reactive to light.   Cardiovascular:      Rate and Rhythm: Normal rate and regular rhythm.   Pulmonary:      Effort: Pulmonary effort is normal. No respiratory distress.      Breath sounds: Normal breath sounds.   Musculoskeletal:         General: Normal range of motion.   Skin:     General: Skin is warm and  dry.   Neurological:      Mental Status: He is alert and oriented to person, place, and time.   Psychiatric:         Behavior: Behavior normal.         Thought Content: Thought content normal.         Judgment: Judgment normal.         VITAL SIGNS:   Vitals:    12/30/24 1035   BP: 136/72   Pulse: 86   Resp: 16   Temp: 96.9 °F (36.1 °C)   TempSrc: Temporal   SpO2: 98%   Weight: 76.1 kg (167 lb 12.8 oz)             LABORATORY:  PSA 9/1/2021 = 2.64 ng/ml  PSA 3/4/2022 = 2.12 ng/ml  PSA 9/2/2022 = 1.58 ng/ml  PSA 3/10/2023 = 1.22 ng/ml  PSA 3/15/2024 = 0.69 ng/ml        The following portions of the patient's history were reviewed and updated as appropriate: allergies, current medications, past family history, past medical history, past social history, past surgical history and problem list.         Diagnoses and all orders for this visit:    1. Prostate cancer (Primary)         IMPRESSION:  Prostate cancer, Nichole score 3+3 = 6, clinical stage IIA (T1c, N0, M0), initially under active surveillance since diagnosis in 2019.  PSA reached maximum value 10.19 ng/ml with subsequent pretreatment PSA 7.26 ng/ml.    3-1/2 years status post CyberKnife SBRT.  He tolerated treatment well.  No symptomology to suggest late radiation related toxicities at this time.  He continues Flomax every other day for management of mild LUTS.  He has had a very good biochemical response to treatment with ongoing reduction in PSA values and no evidence of persistent/recurrent disease.  I suspect PSA has likely not yet reached kota value but is appropriately declining/low at 0.659 ng/ml.  He is scheduled for annual PSA in March 2025 with urology.  Prognosis should continue to remain very good at this point.   We again reviewed follow-up intervals, serial PSA monitoring, and continued expectations for response to treatment.    RECOMMENDATIONS:   Mr. Valenzuela continues routine urologic surveillance under the care of Dr. Gibbons, with follow-up and  repeat PSA scheduled in March 2025.  We will schedule 1 year follow up visit and will likely release the patient to urology once PSA is <=0.5 ng/ml.      Return in about 1 year (around 12/30/2025) for Office Visit, Appt with YASEMIN.    YASEMIN Ramos      I spent a total of 29 minutes on today's visit, with more than 15 minutes in direct face to face communication, and the remainder of the time spent in reviewing the relevant history, records, available imaging, and for documentation.